# Patient Record
Sex: FEMALE | URBAN - NONMETROPOLITAN AREA
[De-identification: names, ages, dates, MRNs, and addresses within clinical notes are randomized per-mention and may not be internally consistent; named-entity substitution may affect disease eponyms.]

---

## 2023-01-06 ENCOUNTER — OFFICE VISIT (OUTPATIENT)
Dept: URBAN - NONMETROPOLITAN AREA CLINIC 10 | Facility: CLINIC | Age: 33
End: 2023-01-06

## 2023-01-06 DIAGNOSIS — H16.223 KERATOCONJUNCT SICCA, NOT SPECIFIED AS SJOGREN'S, BILATERAL: ICD-10-CM

## 2023-01-06 DIAGNOSIS — H52.223 REGULAR ASTIGMATISM, BILATERAL: Primary | ICD-10-CM

## 2023-01-06 PROCEDURE — 92002 INTRM OPH EXAM NEW PATIENT: CPT | Performed by: STUDENT IN AN ORGANIZED HEALTH CARE EDUCATION/TRAINING PROGRAM

## 2023-01-06 ASSESSMENT — VISUAL ACUITY
OD: 20/20
OS: 20/20

## 2023-01-06 ASSESSMENT — INTRAOCULAR PRESSURE
OD: 15
OS: 15

## 2023-01-06 NOTE — IMPRESSION/PLAN
Impression: Keratoconjunct sicca, not specified as Sjogren's, bilateral: H07.406. Plan: Patient educated on diagnosis. Recommend artificial tears ( Systane, Refresh, FreshKote, TheraTears, Soothe) 3-4x/day OU and warm compresses 2x/day for 10 minutes at a time (with clean washcloth or clean sock filled with uncooked rice in microwave for 45-60 seconds). - Discussed punctal plugs, Restasis, Xiidra as second line treatment options